# Patient Record
Sex: FEMALE | Race: WHITE
[De-identification: names, ages, dates, MRNs, and addresses within clinical notes are randomized per-mention and may not be internally consistent; named-entity substitution may affect disease eponyms.]

---

## 2018-10-28 ENCOUNTER — HOSPITAL ENCOUNTER (EMERGENCY)
Dept: HOSPITAL 92 - ERS | Age: 48
Discharge: HOME | End: 2018-10-28
Payer: COMMERCIAL

## 2018-10-28 DIAGNOSIS — I10: ICD-10-CM

## 2018-10-28 DIAGNOSIS — O03.9: Primary | ICD-10-CM

## 2018-10-28 LAB
BASOPHILS # BLD AUTO: 0.1 THOU/UL (ref 0–0.2)
BASOPHILS NFR BLD AUTO: 0.9 % (ref 0–1)
CRYSTAL-AUWI FLAG: 0.1 (ref 0–15)
EOSINOPHIL # BLD AUTO: 0.4 THOU/UL (ref 0–0.7)
EOSINOPHIL NFR BLD AUTO: 4.8 % (ref 0–10)
HEV IGM SER QL: 0.4 (ref 0–7.99)
HGB BLD-MCNC: 11.4 G/DL (ref 12–16)
HYALINE CASTS #/AREA URNS LPF: (no result) LPF
LYMPHOCYTES # BLD: 1.9 THOU/UL (ref 1.2–3.4)
LYMPHOCYTES NFR BLD AUTO: 20.5 % (ref 21–51)
MCH RBC QN AUTO: 25.5 PG (ref 27–31)
MCV RBC AUTO: 82.5 FL (ref 78–98)
MONOCYTES # BLD AUTO: 0.7 THOU/UL (ref 0.11–0.59)
MONOCYTES NFR BLD AUTO: 7.6 % (ref 0–10)
NEUTROPHILS # BLD AUTO: 6.2 THOU/UL (ref 1.4–6.5)
NEUTROPHILS NFR BLD AUTO: 66.2 % (ref 42–75)
PATHC CAST-AUWI FLAG: 0 (ref 0–2.49)
PLATELET # BLD AUTO: 352 THOU/UL (ref 130–400)
RBC # BLD AUTO: 4.47 MILL/UL (ref 4.2–5.4)
RBC UR QL AUTO: (no result) HPF (ref 0–3)
SP GR UR STRIP: 1.01 (ref 1–1.04)
SPERM-AUWI FLAG: 0 (ref 0–9.9)
WBC # BLD AUTO: 9.4 THOU/UL (ref 4.8–10.8)
YEAST-AUWI FLAG: 0 (ref 0–25)

## 2018-10-28 PROCEDURE — 81015 MICROSCOPIC EXAM OF URINE: CPT

## 2018-10-28 PROCEDURE — 86900 BLOOD TYPING SEROLOGIC ABO: CPT

## 2018-10-28 PROCEDURE — 85025 COMPLETE CBC W/AUTO DIFF WBC: CPT

## 2018-10-28 PROCEDURE — 76856 US EXAM PELVIC COMPLETE: CPT

## 2018-10-28 PROCEDURE — 36415 COLL VENOUS BLD VENIPUNCTURE: CPT

## 2018-10-28 PROCEDURE — 84702 CHORIONIC GONADOTROPIN TEST: CPT

## 2018-10-28 PROCEDURE — 86901 BLOOD TYPING SEROLOGIC RH(D): CPT

## 2018-10-28 PROCEDURE — 81003 URINALYSIS AUTO W/O SCOPE: CPT

## 2018-10-28 NOTE — ULT
OBSTETRIC SONOGRAM TRANSABDOMINAL AND TRANSVAGINAL IMAGING:

 

History: Early pregnancy. Vaginal bleeding. 

 

FINDINGS: 

Urinary bladder is incompletely distended. Uterus measures up to 9.5 cm. Endometrium is thickened at 
1.3 cm. Within the lower uterine segment and cervix, gestational sac contains a small yolk sac. Fetal
 pole not well demonstrated. Measurements correlate with 6 weeks 6 days gestational age. 

 

No free fluid. The right ovary is 2.7 cm. Left ovary is not visualized. 

 

IMPRESSION: 

 in progress. Gestational sac size correlates with 6 weeks 6 days. 

 

POS: JOSTIN